# Patient Record
Sex: FEMALE | Race: WHITE | NOT HISPANIC OR LATINO | Employment: FULL TIME | URBAN - METROPOLITAN AREA
[De-identification: names, ages, dates, MRNs, and addresses within clinical notes are randomized per-mention and may not be internally consistent; named-entity substitution may affect disease eponyms.]

---

## 2017-04-22 ENCOUNTER — HOSPITAL ENCOUNTER (EMERGENCY)
Facility: HOSPITAL | Age: 27
Discharge: HOME | End: 2017-04-22
Payer: MEDICAID

## 2017-04-22 VITALS
RESPIRATION RATE: 17 BRPM | TEMPERATURE: 98 F | OXYGEN SATURATION: 100 % | BODY MASS INDEX: 35.44 KG/M2 | WEIGHT: 200 LBS | DIASTOLIC BLOOD PRESSURE: 99 MMHG | HEART RATE: 82 BPM | SYSTOLIC BLOOD PRESSURE: 162 MMHG | HEIGHT: 63 IN

## 2017-04-22 DIAGNOSIS — B96.89 BACTERIAL VAGINOSIS: Primary | ICD-10-CM

## 2017-04-22 DIAGNOSIS — N76.0 BACTERIAL VAGINOSIS: Primary | ICD-10-CM

## 2017-04-22 LAB
B-HCG UR QL: NEGATIVE
BACTERIA GENITAL QL WET PREP: ABNORMAL
BILIRUB UR QL STRIP: NEGATIVE
C TRACH DNA SPEC QL NAA+PROBE: NOT DETECTED
CLARITY UR: CLEAR
CLUE CELLS VAG QL WET PREP: ABNORMAL
COLOR UR: YELLOW
FILAMENT FUNGI VAG WET PREP-#/AREA: ABNORMAL
GLUCOSE UR QL STRIP: NEGATIVE
HGB UR QL STRIP: NEGATIVE
KETONES UR QL STRIP: NEGATIVE
LEUKOCYTE ESTERASE UR QL STRIP: NEGATIVE
N GONORRHOEA DNA SPEC QL NAA+PROBE: NOT DETECTED
NITRITE UR QL STRIP: NEGATIVE
PH UR STRIP: 6 [PH] (ref 5–8)
PROT UR QL STRIP: NEGATIVE
SP GR UR STRIP: 1.01 (ref 1–1.03)
SPECIMEN SOURCE: ABNORMAL
T VAGINALIS GENITAL QL WET PREP: ABNORMAL
URN SPEC COLLECT METH UR: NORMAL
UROBILINOGEN UR STRIP-ACNC: NEGATIVE EU/DL
WBC #/AREA VAG WET PREP: ABNORMAL
YEAST GENITAL QL WET PREP: ABNORMAL

## 2017-04-22 PROCEDURE — 87210 SMEAR WET MOUNT SALINE/INK: CPT

## 2017-04-22 PROCEDURE — 87591 N.GONORRHOEAE DNA AMP PROB: CPT

## 2017-04-22 PROCEDURE — 99284 EMERGENCY DEPT VISIT MOD MDM: CPT | Mod: 25

## 2017-04-22 PROCEDURE — 81025 URINE PREGNANCY TEST: CPT

## 2017-04-22 PROCEDURE — 96372 THER/PROPH/DIAG INJ SC/IM: CPT

## 2017-04-22 PROCEDURE — 25000003 PHARM REV CODE 250: Performed by: PHYSICIAN ASSISTANT

## 2017-04-22 PROCEDURE — 81003 URINALYSIS AUTO W/O SCOPE: CPT

## 2017-04-22 PROCEDURE — 63600175 PHARM REV CODE 636 W HCPCS: Performed by: PHYSICIAN ASSISTANT

## 2017-04-22 RX ORDER — CEFTRIAXONE 250 MG/1
250 INJECTION, POWDER, FOR SOLUTION INTRAMUSCULAR; INTRAVENOUS
Status: COMPLETED | OUTPATIENT
Start: 2017-04-22 | End: 2017-04-22

## 2017-04-22 RX ORDER — AZITHROMYCIN 250 MG/1
1000 TABLET, FILM COATED ORAL
Status: COMPLETED | OUTPATIENT
Start: 2017-04-22 | End: 2017-04-22

## 2017-04-22 RX ORDER — METRONIDAZOLE 500 MG/1
500 TABLET ORAL EVERY 12 HOURS
Qty: 14 TABLET | Refills: 0 | Status: SHIPPED | OUTPATIENT
Start: 2017-04-22 | End: 2017-04-29

## 2017-04-22 RX ADMIN — AZITHROMYCIN 1000 MG: 250 TABLET, FILM COATED ORAL at 04:04

## 2017-04-22 RX ADMIN — CEFTRIAXONE SODIUM 250 MG: 250 INJECTION, POWDER, FOR SOLUTION INTRAMUSCULAR; INTRAVENOUS at 04:04

## 2017-04-22 NOTE — ED NOTES
Pt reports he is having vaginal discharge.  Reports white or pinkish drainage. Also reports painful intercourse.

## 2017-04-22 NOTE — ED AVS SNAPSHOT
[facility] -   [address] [facility] [address]               Gerson Jimenez   2017  3:36 PM   ED    Description:  Female : 1990   Department:  [facility]           Your Care was Coordinated By:     Provider Role From To    PAMELLA Bill Physician Assistant [phone] --      Reason for Visit     Exposure to STD           Diagnoses this Visit        Comments    Bacterial vaginosis    -  Primary       ED Disposition     None           To Do List           Follow-up Information     Follow up with PCP. Call in 2 days.       These Medications        Disp Refills Start End    metronidazole (FLAGYL) 500 MG tablet 14 tablet 0 2017    Take 1 tablet (500 mg total) by mouth every 12 (twelve) hours. - Oral      [facility]     [facility] -  Assistance  Unless otherwise directed by your provider, please contact [facility], our nurse care line that is available for  assistance.     Registered nurses in the [facility] provide: appointment scheduling, clinical advisement, health education, and other advisory services.  Call: [phone] (toll free)               Medications           Message regarding Medications     Verify the changes and/or additions to your medication regime listed below are the same as discussed with your clinician today.  If any of these changes or additions are incorrect, please notify your healthcare provider.        START taking these NEW medications        Refills    metronidazole (FLAGYL) 500 MG tablet 0    Sig: Take 1 tablet (500 mg total) by mouth every 12 (twelve) hours.    Class: Print    Route: Oral      These medications were administered today        Dose Freq    cefTRIAXone injection 250 mg 250 mg ED 1 Time    Sig: Inject 250 mg into the muscle ED 1 Time.    Class: Normal    Route: Intramuscular    Cosign for Ordering: Accepted by  "Kelsea Mccann DO on 4/22/2017  4:27 PM    azithromycin tablet 1,000 mg 1,000 mg ED 1 Time    Sig: Take 4 tablets (1,000 mg total) by mouth ED 1 Time.    Class: Normal    Route: Oral    Cosign for Ordering: Accepted by Kelsea Mccann DO on 4/22/2017  4:27 PM           Verify that the below list of medications is an accurate representation of the medications you are currently taking.  If none reported, the list may be blank. If incorrect, please contact your healthcare provider. Carry this list with you in case of emergency.           Current Medications     metronidazole (FLAGYL) 500 MG tablet Take 1 tablet (500 mg total) by mouth every 12 (twelve) hours.           Clinical Reference Information           Your Vitals Were     BP Pulse Temp Resp Height Weight    162/99 (BP Location: Right arm, Patient Position: Sitting) 82 97.9 °F (36.6 °C) (Oral) 17 5' 3" (1.6 m) 90.7 kg (200 lb)    SpO2 BMI             100% 35.43 kg/m2         Allergies as of 4/22/2017        Reactions    Pcn [Penicillins] Nausea And Vomiting      Immunizations Administered on Date of Encounter - 4/22/2017     None      ED Micro, Lab, POCT     Start Ordered       Status Ordering Provider    04/22/17 1607 04/22/17 1607  Vaginal Screen Vagina  STAT      Final result     04/22/17 1607 04/22/17 1607  C. trachomatis/N. gonorrhoeae by AMP DNA Vagina  STAT      In process     04/22/17 1536 04/22/17 1536  Urinalysis  STAT      Final result     04/22/17 1536 04/22/17 1536  Pregnancy, urine rapid (UPT)  Once      Final result       ED Imaging Orders     None        Discharge Instructions         Bacterial Vaginosis    You have a vaginal infection called bacterial vaginosis (BV). Both good and bad bacteria are present in a healthy vagina. BV occurs when these bacteria get out of balance. The number of bad bacteria increase. And the number of good bacteria decrease.  BV may or may not cause symptoms. If symptoms do occur, they can include:  · Thin, " gray, milky-white, or sometimes green discharge  · Unpleasant odor or fishy smell  · Itching, burning, or pain in or around the vagina  It is not known what causes BV, but certain factors can make the problem more likely. This can include:  · Douching  · Having sex with a new partner  · Having sex with more than one partner  BV will sometimes go away on its own. But treatment is usually recommended. This is because untreated BV can increase the risk of more serious health problems such as:  · Pelvic inflammatory disease (PID)  ·  delivery (giving birth to a baby early if youre pregnant)  · HIV and certain other sexually transmitted diseases (STDs)  · Infection after surgery on the reproductive organs  Home care  General care  · BV is most often treated with medicines called antibiotics. These may be given as pills or as a vaginal cream. If antibiotics are prescribed, be sure to use them exactly as directed. Also, be sure to complete all of the medicine, even if your symptoms go away.  · Avoid douching or having sex during treatment.  · If you have sex with a female partner, ask your healthcare provider if she should also be treated.  Prevention  · Limit or avoid douching.  · Avoid having sex. If you do have sex, then take steps to lower your risk:  ¨ Use condoms when having sex.  ¨ Limit the number of partners you have sex with.  Follow-up care  Follow up with your healthcare provider, or as advised.  When to seek medical advice  Call your healthcare provider right away if:  · You have a fever of 100.4ºF (38ºC) or higher, or as directed by your provider.  · Your symptoms worsen, or they dont go away within a few days of starting treatment.  · You have new pain in the lower belly or pelvic region.  · You have side effects that bother you or a reaction to the pills or cream youre prescribed.  · You or any partners you have sex with have new symptoms, such as a rash, joint pain, or sores.  Date Last  Reviewed: 7/30/2015  © 7323-8384 [employer]. [address] [place] [address], [place] [address]. All rights reserved. This information is not intended as a substitute for professional medical care. Always follow your healthcare professional's instructions.          [facility] Sign-Up     Activating your [facility] account is as easy as 1-2-3!     1) Visit [email or web address], select Sign Up Now, enter this activation code and your date of birth, then select Next.  [email or web address]  Expires: 6/6/2017  5:13 PM      2) Create a username and password to use when you visit [facility] in the future and select a security question in case you lose your password and select Next.    3) Enter your e-mail address and click Sign Up!    Additional Information  If you have questions, please e-mail [email or web address] or call [phone] to talk to our [facility] staff. Remember, [facility] is NOT to be used for urgent needs. For medical emergencies, dial 911.         Smoking Cessation     If you would like to quit smoking:   You may be eligible for free services if you are a [place] resident and started smoking cigarettes before September 1, 1988.  Call the Smoking Cessation Trust (SCT) toll free at (432) 872-1139 or (782) 474-9332.   Call 8-739-QUIT-NOW if you do not meet the above criteria.   Contact us via email: [email or web address]   View our website for more information: [email or web address]         [facility] complies with applicable Federal civil rights laws and does not discriminate on the basis of race, color, national origin, age, disability, or sex.        Language Assistance Services     ATTENTION: Language assistance services are available, free of charge. Please call [phone].      ATENCIÓN: Si habla español, tiene a carreno disposición servicios gratuitos de asistencia lingüística. Llame al [phone].     CHÚ Ý: N?u b?n nói Ti?ng Vi?t, có các d?ch v? h? tr? ngôn ng? mi?n phí  dành cho b?cira. G?i s? [phone].

## 2017-04-22 NOTE — ED PROVIDER NOTES
SCRIBE #1 NOTE: I, Karyn Robison, am scribing for, and in the presence of, PAMELLA Bill. I have scribed the entire note.      History      Chief Complaint   Patient presents with    Exposure to STD     unknown which STD, c/o pink-tinged white vaginal discharge and pelvic pain       Review of patient's allergies indicates:   Allergen Reactions    Pcn [penicillins] Nausea And Vomiting        HPI   HPI    4/22/2017, 4:03 PM   History obtained from the patient      History of Present Illness: Gerson Jimenez is a 26 y.o. female patient who presents to the Emergency Department for sore throat. Pt reports 2 weeks ago her partner was tested positive for STD. Symptoms are constant and moderate in severity. No mitigating or exacerbating factors reported. Associated sxs include pinkish-white vaginal discharge, pelvic pain, dysuria, and light-headedness. Patient denies any CP, SOB, fever, vaginal bleeding, hematuria, and all other sxs at this time. No prior Tx. No further complaints or concerns at this time.         Arrival mode: Personal vehicle    PCP: Unknown     Past Medical History:  Past medical history reviewed not relevant      Past Surgical History:  Past surgical history reviewed not relevant      Family History:  Family history reviewed not relevant      Social History:  Social History    Social History Main Topics    Social History Main Topics    Smoking status: Unknown if ever smoked    Smokeless tobacco: Unknown if ever used    Alcohol Use: Unknown drinking history    Drug Use: Unknown if ever used    Sexual Activity: Unknown         ROS   Review of Systems   Constitutional: Negative for fever.   HENT: Positive for sore throat.    Respiratory: Negative for shortness of breath.    Cardiovascular: Negative for chest pain.   Gastrointestinal: Negative for diarrhea, nausea and vomiting.   Genitourinary: Positive for dysuria, pelvic pain and vaginal discharge. Negative for hematuria and vaginal  bleeding.   Musculoskeletal: Negative for back pain.   Skin: Negative for rash.   Neurological: Positive for light-headedness. Negative for weakness.   Hematological: Does not bruise/bleed easily.   All other systems reviewed and are negative.      Physical Exam    Initial Vitals   BP Pulse Resp Temp SpO2   04/22/17 1534 04/22/17 1534 04/22/17 1534 04/22/17 1534 04/22/17 1534   162/99 82 17 97.9 °F (36.6 °C) 100 %      Physical Exam  Nursing Notes and Vital Signs Reviewed.  Constitutional: Patient is in no acute distress. Awake and alert. Well-developed and well-nourished.  Head: Atraumatic. Normocephalic.  Eyes: PERRL. EOM intact. Conjunctivae are not pale. No scleral icterus.  ENT: Mucous membranes are moist. Oropharynx is clear and symmetric.    Neck: Supple. Full ROM. No lymphadenopathy.  Cardiovascular: Regular rate. Regular rhythm. No murmurs, rubs, or gallops. Distal pulses are 2+ and symmetric.  Pulmonary/Chest: No respiratory distress. Clear to auscultation bilaterally. No wheezing, rales, or rhonchi.  Abdominal: Soft and non-distended.  There is no tenderness.  No rebound, guarding, or rigidity. Good bowel sounds.  Musculoskeletal: Moves all extremities. No obvious deformities. No edema. No calf tenderness.  Skin: Warm and dry.  Neurological:  Alert, awake, and appropriate.  Normal speech.  No acute focal neurological deficits are appreciated.  Psychiatric: Normal affect. Good eye contact. Appropriate in content.  Pelvic: A female chaperone was present for this examination. Nl external inspection. No lesions or abnormalities were visible on the labia majora or minora. Cervical os is closed. There is no CMT. There is no blood in the vaginal vault. Scant amount of discharge. Thick, clear, and non-odorous discharge. No adnexal tenderness. No adnexal masses.      ED Course    Procedures  ED Vital Signs:  Vitals:    04/22/17 1534   BP: (!) 162/99   Pulse: 82   Resp: 17   Temp: 97.9 °F (36.6 °C)   TempSrc: Oral  "  SpO2: 100%   Weight: 90.7 kg (200 lb)   Height: 5' 3" (1.6 m)       Abnormal Lab Results:  Labs Reviewed   VAGINAL SCREEN - Abnormal; Notable for the following:        Result Value    Clue Cells, Wet Prep Few (*)     WBC - Vaginal Screen Few (*)     Bacteria - Vaginal Screen Few (*)     All other components within normal limits   C. TRACHOMATIS/N. GONORRHOEAE BY AMP DNA   URINALYSIS   PREGNANCY TEST, URINE RAPID        All Lab Results:  Results for orders placed or performed during the hospital encounter of 04/22/17   Urinalysis   Result Value Ref Range    Specimen UA Urine, Clean Catch     Color, UA Yellow Yellow, Straw, Maryse    Appearance, UA Clear Clear    pH, UA 6.0 5.0 - 8.0    Specific Gravity, UA 1.010 1.005 - 1.030    Protein, UA Negative Negative    Glucose, UA Negative Negative    Ketones, UA Negative Negative    Bilirubin (UA) Negative Negative    Occult Blood UA Negative Negative    Nitrite, UA Negative Negative    Urobilinogen, UA Negative <2.0 EU/dL    Leukocytes, UA Negative Negative   Pregnancy, urine rapid (UPT)   Result Value Ref Range    Preg Test, Ur Negative    Vaginal Screen Vagina   Result Value Ref Range    Trichomonas None None    Clue Cells, Wet Prep Few (A) None    Budding Yeast None None    Fungal Hyphae None None    WBC - Vaginal Screen Few (A) None    Bacteria - Vaginal Screen Few (A) None    Wet Prep Source Vagina None              The Emergency Provider reviewed the vital signs and test results, which are outlined above.    ED Discussion     5:16 PM: Discussed with pt all pertinent ED information and results. Discussed pt dx and plan of tx. Gave pt all f/u and return to the ED instructions. All questions and concerns were addressed at this time. Pt expresses understanding of information and instructions, and is comfortable with plan to discharge. Pt is stable for discharge.        ED Medication(s):  Medications   cefTRIAXone injection 250 mg (250 mg Intramuscular Given 4/22/17 " 1627)   azithromycin tablet 1,000 mg (1,000 mg Oral Given 4/22/17 1625)       New Prescriptions    METRONIDAZOLE (FLAGYL) 500 MG TABLET    Take 1 tablet (500 mg total) by mouth every 12 (twelve) hours.       Follow-up Information     Follow up with PCP. Call in 2 days.            Medical Decision Making    Medical Decision Making:   Clinical Tests:   Lab Tests: Ordered and Reviewed           Scribe Attestation:   Scribe #1: I performed the above scribed service and the documentation accurately describes the services I performed. I attest to the accuracy of the note.    Attending:   Physician Attestation Statement for Scribe #1: I, PAMELLA Bill, personally performed the services described in this documentation, as scribed by Karyn Robison, in my presence, and it is both accurate and complete.          Clinical Impression       ICD-10-CM ICD-9-CM   1. Bacterial vaginosis N76.0 616.10    B96.89 041.9       Disposition:   Disposition: Discharged  Condition: Stable         PAMELLA Bill  04/25/17 1258

## 2017-04-22 NOTE — DISCHARGE INSTRUCTIONS

## 2017-07-17 ENCOUNTER — HOSPITAL ENCOUNTER (EMERGENCY)
Facility: HOSPITAL | Age: 27
Discharge: HOME | End: 2017-07-18
Attending: EMERGENCY MEDICINE
Payer: MEDICAID

## 2017-07-17 DIAGNOSIS — R52 PAIN: ICD-10-CM

## 2017-07-17 DIAGNOSIS — M25.561 ACUTE PAIN OF RIGHT KNEE: Primary | ICD-10-CM

## 2017-07-17 PROCEDURE — 99283 EMERGENCY DEPT VISIT LOW MDM: CPT

## 2017-07-18 VITALS
WEIGHT: 210 LBS | HEART RATE: 79 BPM | HEIGHT: 63 IN | DIASTOLIC BLOOD PRESSURE: 79 MMHG | OXYGEN SATURATION: 99 % | RESPIRATION RATE: 17 BRPM | BODY MASS INDEX: 37.21 KG/M2 | TEMPERATURE: 98 F | SYSTOLIC BLOOD PRESSURE: 142 MMHG

## 2017-07-18 PROCEDURE — 25000003 PHARM REV CODE 250: Performed by: EMERGENCY MEDICINE

## 2017-07-18 RX ORDER — HYDROCODONE BITARTRATE AND ACETAMINOPHEN 5; 325 MG/1; MG/1
1 TABLET ORAL EVERY 4 HOURS PRN
Qty: 18 TABLET | Refills: 0 | Status: SHIPPED | OUTPATIENT
Start: 2017-07-18 | End: 2017-12-30 | Stop reason: ALTCHOICE

## 2017-07-18 RX ORDER — MELOXICAM 15 MG/1
15 TABLET ORAL DAILY
Qty: 15 TABLET | Refills: 0 | Status: SHIPPED | OUTPATIENT
Start: 2017-07-18 | End: 2017-12-30 | Stop reason: ALTCHOICE

## 2017-07-18 RX ORDER — HYDROCODONE BITARTRATE AND ACETAMINOPHEN 10; 325 MG/1; MG/1
1 TABLET ORAL
Status: COMPLETED | OUTPATIENT
Start: 2017-07-18 | End: 2017-07-18

## 2017-07-18 RX ADMIN — HYDROCODONE BITARTRATE AND ACETAMINOPHEN 1 TABLET: 10; 325 TABLET ORAL at 12:07
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
## 2017-12-30 ENCOUNTER — HOSPITAL ENCOUNTER (EMERGENCY)
Facility: HOSPITAL | Age: 27
Discharge: HOME | End: 2017-12-30
Attending: EMERGENCY MEDICINE
Payer: MEDICAID

## 2017-12-30 VITALS
HEART RATE: 94 BPM | RESPIRATION RATE: 18 BRPM | DIASTOLIC BLOOD PRESSURE: 85 MMHG | SYSTOLIC BLOOD PRESSURE: 169 MMHG | WEIGHT: 200 LBS | TEMPERATURE: 99 F | HEIGHT: 63 IN | OXYGEN SATURATION: 98 % | BODY MASS INDEX: 35.44 KG/M2

## 2017-12-30 DIAGNOSIS — A59.9 TRICHOMONOSIS: Primary | ICD-10-CM

## 2017-12-30 DIAGNOSIS — M54.50 ACUTE BILATERAL LOW BACK PAIN WITHOUT SCIATICA: ICD-10-CM

## 2017-12-30 LAB
B-HCG UR QL: NEGATIVE
BACTERIA GENITAL QL WET PREP: ABNORMAL
BILIRUB UR QL STRIP: NEGATIVE
CLARITY UR: CLEAR
CLUE CELLS VAG QL WET PREP: ABNORMAL
COLOR UR: YELLOW
FILAMENT FUNGI VAG WET PREP-#/AREA: ABNORMAL
GLUCOSE UR QL STRIP: NEGATIVE
HGB UR QL STRIP: NEGATIVE
KETONES UR QL STRIP: NEGATIVE
LEUKOCYTE ESTERASE UR QL STRIP: NEGATIVE
NITRITE UR QL STRIP: NEGATIVE
PH UR STRIP: 6 [PH] (ref 5–8)
PROT UR QL STRIP: ABNORMAL
SP GR UR STRIP: >=1.03 (ref 1–1.03)
SPECIMEN SOURCE: ABNORMAL
T VAGINALIS GENITAL QL WET PREP: ABNORMAL
URN SPEC COLLECT METH UR: ABNORMAL
UROBILINOGEN UR STRIP-ACNC: NEGATIVE EU/DL
WBC #/AREA VAG WET PREP: ABNORMAL
YEAST GENITAL QL WET PREP: ABNORMAL

## 2017-12-30 PROCEDURE — 81003 URINALYSIS AUTO W/O SCOPE: CPT

## 2017-12-30 PROCEDURE — 81025 URINE PREGNANCY TEST: CPT

## 2017-12-30 PROCEDURE — 96372 THER/PROPH/DIAG INJ SC/IM: CPT

## 2017-12-30 PROCEDURE — 25000003 PHARM REV CODE 250: Performed by: EMERGENCY MEDICINE

## 2017-12-30 PROCEDURE — 87491 CHLMYD TRACH DNA AMP PROBE: CPT

## 2017-12-30 PROCEDURE — 63600175 PHARM REV CODE 636 W HCPCS: Performed by: EMERGENCY MEDICINE

## 2017-12-30 PROCEDURE — 99284 EMERGENCY DEPT VISIT MOD MDM: CPT | Mod: 25

## 2017-12-30 PROCEDURE — 87210 SMEAR WET MOUNT SALINE/INK: CPT

## 2017-12-30 RX ORDER — CYCLOBENZAPRINE HCL 10 MG
10 TABLET ORAL 3 TIMES DAILY PRN
Qty: 9 TABLET | Refills: 0 | Status: SHIPPED | OUTPATIENT
Start: 2017-12-30 | End: 2018-01-04

## 2017-12-30 RX ORDER — ONDANSETRON 4 MG/1
4 TABLET, ORALLY DISINTEGRATING ORAL
Status: COMPLETED | OUTPATIENT
Start: 2017-12-30 | End: 2017-12-30

## 2017-12-30 RX ORDER — AZITHROMYCIN 250 MG/1
1000 TABLET, FILM COATED ORAL
Status: COMPLETED | OUTPATIENT
Start: 2017-12-30 | End: 2017-12-30

## 2017-12-30 RX ORDER — CEFTRIAXONE 250 MG/1
250 INJECTION, POWDER, FOR SOLUTION INTRAMUSCULAR; INTRAVENOUS
Status: COMPLETED | OUTPATIENT
Start: 2017-12-30 | End: 2017-12-30

## 2017-12-30 RX ORDER — METRONIDAZOLE 500 MG/1
500 TABLET ORAL 3 TIMES DAILY
Qty: 21 TABLET | Refills: 0 | Status: SHIPPED | OUTPATIENT
Start: 2017-12-30 | End: 2018-01-06

## 2017-12-30 RX ORDER — ONDANSETRON 4 MG/1
4 TABLET, ORALLY DISINTEGRATING ORAL EVERY 8 HOURS PRN
Qty: 12 TABLET | Refills: 0 | Status: SHIPPED | OUTPATIENT
Start: 2017-12-30 | End: 2022-06-10

## 2017-12-30 RX ORDER — NAPROXEN 500 MG/1
500 TABLET ORAL
Status: COMPLETED | OUTPATIENT
Start: 2017-12-30 | End: 2017-12-30

## 2017-12-30 RX ORDER — TRAMADOL HYDROCHLORIDE 50 MG/1
50 TABLET ORAL EVERY 6 HOURS PRN
Qty: 12 TABLET | Refills: 0 | Status: SHIPPED | OUTPATIENT
Start: 2017-12-30 | End: 2022-06-10

## 2017-12-30 RX ORDER — TRAMADOL HYDROCHLORIDE 50 MG/1
50 TABLET ORAL
Status: COMPLETED | OUTPATIENT
Start: 2017-12-30 | End: 2017-12-30

## 2017-12-30 RX ORDER — NAPROXEN 500 MG/1
500 TABLET ORAL 2 TIMES DAILY WITH MEALS
Qty: 20 TABLET | Refills: 0 | Status: SHIPPED | OUTPATIENT
Start: 2017-12-30 | End: 2022-06-10

## 2017-12-30 RX ADMIN — TRAMADOL HYDROCHLORIDE 50 MG: 50 TABLET, COATED ORAL at 12:12

## 2017-12-30 RX ADMIN — NAPROXEN 500 MG: 500 TABLET ORAL at 12:12

## 2017-12-30 RX ADMIN — AZITHROMYCIN 1000 MG: 250 TABLET, FILM COATED ORAL at 12:12

## 2017-12-30 RX ADMIN — ONDANSETRON 4 MG: 4 TABLET, ORALLY DISINTEGRATING ORAL at 12:12

## 2017-12-30 RX ADMIN — CEFTRIAXONE SODIUM 250 MG: 250 INJECTION, POWDER, FOR SOLUTION INTRAMUSCULAR; INTRAVENOUS at 12:12

## 2017-12-30 NOTE — ED PROVIDER NOTES
"SCRIBE #1 NOTE: I, Ivan Lundy, am scribing for, and in the presence of, Miguel Trimble MD. I have scribed the entire note.      History      Chief Complaint   Patient presents with    Back Pain     lower back pain with nausea. Denies other urinary symptoms.        Review of patient's allergies indicates:   Allergen Reactions    Pcn [penicillins] Nausea And Vomiting        HPI   HPI    12/30/2017, 11:18 AM   History obtained from the patient      History of Present Illness: Gerson Jimenez is a 27 y.o. female patient who presents to the Emergency Department for lower back pain which onset gradually " a few days ago". Symptoms are constant and moderate in severity.  No mitigating or exacerbating factors reported. Associated sxs include nausea. Pt also complains that her sexual partner was recently diagnosed with mycoplasma genital, and requests treatment at this time. Pt complains of "white" vaginal discharge. Pt's LNMP was 12/14/2017. Patient denies any fever, chills, bladder/ bowel incontinence, saddle anesthesia, vaginal bleeding/ pain, dysuria, hematuria, and all other sxs at this time. No further complaints or concerns at this time.         Arrival mode: Personal vehicle    PCP: No primary care provider on file.       Past Medical History:  History reviewed. No pertinent past medical history.    Past Surgical History:  Past Surgical History:   Procedure Laterality Date    ABDOMINAL SURGERY      APPENDECTOMY      TONSILLECTOMY           Family History:  Family History   Problem Relation Age of Onset    No Known Problems Mother     No Known Problems Father        Social History:  Social History     Social History Main Topics    Smoking status: Never Smoker    Smokeless tobacco: Never Used    Alcohol use No    Drug use: No    Sexual activity: Yes       ROS   Review of Systems   Constitutional: Negative for chills and fever.        + possible exposure to STD   Respiratory: Negative for shortness of " breath.    Cardiovascular: Negative for chest pain.   Gastrointestinal: Negative for abdominal pain, nausea and vomiting.        - bowel incontinence   Genitourinary: Positive for vaginal discharge. Negative for difficulty urinating, dysuria, frequency, hematuria, urgency, vaginal bleeding and vaginal pain.        - bladder incontinence   Musculoskeletal: Positive for back pain.   Skin: Negative for rash.   Neurological: Negative for dizziness, weakness, light-headedness, numbness and headaches.        - saddle anesthesia   All other systems reviewed and are negative.      Physical Exam      Initial Vitals [12/30/17 1016]   BP Pulse Resp Temp SpO2   (!) 169/85 94 18 98.9 °F (37.2 °C) 98 %      MAP       113          Physical Exam  Nursing Notes and Vital Signs Reviewed.  Constitutional: Patient is in no apparent distress. Well-developed and well-nourished.  Head: Atraumatic. Normocephalic.  Eyes: PERRL. EOM intact. Conjunctivae are not pale. No scleral icterus.  ENT: Mucous membranes are moist. Oropharynx is clear and symmetric.    Neck: Supple. Full ROM. No lymphadenopathy.  Cardiovascular: Regular rate. Regular rhythm. No murmurs, rubs, or gallops. Distal pulses are 2+ and symmetric.  Pulmonary/Chest: No respiratory distress. Clear to auscultation bilaterally. No wheezing or rales.  Abdominal: Soft and non-distended.  There is no tenderness.  No rebound, guarding, or rigidity. Good bowel sounds.  Musculoskeletal: Moves all extremities. No obvious deformities. No edema.   Back: No midline bony tenderness, deformities, or step-offs of the T-spine or L-spine. Skin appears normal without abrasions or bruising. No erythema, induration, or fluctuance.   Pelvic: A female chaperone was present for this examination. Nl external inspection. No lesions or abnormalities were visible on the labia majora or minora. Cervical os is closed. There is no CMT. There is no blood in the vaginal vault. Scant white, mucoid vaginal  "discharge noted. No adnexal tenderness. No adnexal masses.  Skin: Warm and dry.  Neurological:  Alert, awake, and appropriate.  Normal speech.  No acute focal neurological deficits are appreciated.  Psychiatric: Normal affect. Good eye contact. Appropriate in content.    ED Course    Procedures  ED Vital Signs:  Vitals:    12/30/17 1016   BP: (!) 169/85   Pulse: 94   Resp: 18   Temp: 98.9 °F (37.2 °C)   TempSrc: Oral   SpO2: 98%   Weight: 90.7 kg (200 lb)   Height: 5' 3" (1.6 m)       Abnormal Lab Results:  Labs Reviewed   URINALYSIS - Abnormal; Notable for the following:        Result Value    Specific Gravity, UA >=1.030 (*)     Protein, UA Trace (*)     All other components within normal limits   VAGINAL SCREEN - Abnormal; Notable for the following:     Clue Cells, Wet Prep Few (*)     Bacteria - Vaginal Screen Few (*)     All other components within normal limits   C. TRACHOMATIS/N. GONORRHOEAE BY AMP DNA   PREGNANCY TEST, URINE RAPID        All Lab Results:  Results for orders placed or performed during the hospital encounter of 12/30/17   C. trachomatis/N. gonorrhoeae by AMP DNA Cervix   Result Value Ref Range    Chlamydia, Amplified DNA Not Detected     N gonorrhoeae, amplified DNA Not Detected    Urinalysis Clean Catch   Result Value Ref Range    Specimen UA Urine, Clean Catch     Color, UA Yellow Yellow, Straw, Maryse    Appearance, UA Clear Clear    pH, UA 6.0 5.0 - 8.0    Specific Gravity, UA >=1.030 (A) 1.005 - 1.030    Protein, UA Trace (A) Negative    Glucose, UA Negative Negative    Ketones, UA Negative Negative    Bilirubin (UA) Negative Negative    Occult Blood UA Negative Negative    Nitrite, UA Negative Negative    Urobilinogen, UA Negative <2.0 EU/dL    Leukocytes, UA Negative Negative   Pregnancy, urine rapid (UPT)   Result Value Ref Range    Preg Test, Ur Negative    Vaginal Screen Vagina   Result Value Ref Range    Trichomonas None None    Clue Cells, Wet Prep Few (A) None    Budding Yeast None " None    Fungal Hyphae None None    WBC - Vaginal Screen None None    Bacteria - Vaginal Screen Few (A) None    Wet Prep Source Vagina None            The Emergency Provider reviewed the vital signs and test results, which are outlined above.    ED Discussion     1:28 PM: Discussed with pt all pertinent ED information and results. Discussed pt dx and plan of tx. Gave pt all f/u and return to the ED instructions. All questions and concerns were addressed at this time. Pt expresses understanding of information and instructions, and is comfortable with plan to discharge. Pt is stable for discharge.    I discussed with patient and/or family/caretaker that evaluation in the ED does not suggest any emergent or life threatening medical conditions requiring immediate intervention beyond what was provided in the ED, and I believe patient is safe for discharge.  Regardless, an unremarkable evaluation in the ED does not preclude the development or presence of a serious of life threatening condition. As such, patient was instructed to return immediately for any worsening or change in current symptoms.      ED Medication(s):  Medications   traMADol tablet 50 mg (50 mg Oral Given 12/30/17 1224)   naproxen tablet 500 mg (500 mg Oral Given 12/30/17 1223)   cefTRIAXone injection 250 mg (250 mg Intramuscular Given 12/30/17 1224)   azithromycin tablet 1,000 mg (1,000 mg Oral Given 12/30/17 1224)   ondansetron disintegrating tablet 4 mg (4 mg Oral Given 12/30/17 1256)       Discharge Medication List as of 12/30/2017  1:29 PM      START taking these medications    Details   cyclobenzaprine (FLEXERIL) 10 MG tablet Take 1 tablet (10 mg total) by mouth 3 (three) times daily as needed for Muscle spasms., Starting Sat 12/30/2017, Until u 1/4/2018, Print      metroNIDAZOLE (FLAGYL) 500 MG tablet Take 1 tablet (500 mg total) by mouth 3 (three) times daily., Starting Sat 12/30/2017, Until Sat 1/6/2018, Print      naproxen (NAPROSYN) 500 MG  tablet Take 1 tablet (500 mg total) by mouth 2 (two) times daily with meals., Starting Sat 12/30/2017, Print      ondansetron (ZOFRAN-ODT) 4 MG TbDL Take 1 tablet (4 mg total) by mouth every 8 (eight) hours as needed., Starting Sat 12/30/2017, Print      traMADol (ULTRAM) 50 mg tablet Take 1 tablet (50 mg total) by mouth every 6 (six) hours as needed for Pain., Starting Sat 12/30/2017, Print             Follow-up Information     your PCP.    Why:  Follow up with your primary doctor in the next 2-3 days for a re-check.  Return to the emergency department for any worsening signs or symptoms.                   Medical Decision Making    Medical Decision Making:   Clinical Tests:   Lab Tests: Ordered and Reviewed           Scribe Attestation:   Scribe #1: I performed the above scribed service and the documentation accurately describes the services I performed. I attest to the accuracy of the note.    Attending:   Physician Attestation Statement for Scribe #1: I, Miguel Trimble MD, personally performed the services described in this documentation, as scribed by Ivan Lundy, in my presence, and it is both accurate and complete.          Clinical Impression       ICD-10-CM ICD-9-CM   1. Trichomonosis A59.9 131.9   2. Acute bilateral low back pain without sciatica M54.5 724.2     338.19       Disposition:   Disposition: Discharged  Condition: Stable  ,     Miguel Trimble MD  12/31/17 9484       Miguel Trimble MD  12/31/17 4590

## 2017-12-31 LAB
C TRACH DNA SPEC QL NAA+PROBE: NOT DETECTED
N GONORRHOEA DNA SPEC QL NAA+PROBE: NOT DETECTED

## 2020-06-15 ENCOUNTER — HOSPITAL ENCOUNTER (EMERGENCY)
Facility: HOSPITAL | Age: 30
Discharge: HOME | End: 2020-06-15
Attending: EMERGENCY MEDICINE
Payer: MEDICAID

## 2020-06-15 VITALS
HEIGHT: 63 IN | BODY MASS INDEX: 46.07 KG/M2 | TEMPERATURE: 99 F | OXYGEN SATURATION: 99 % | RESPIRATION RATE: 18 BRPM | DIASTOLIC BLOOD PRESSURE: 79 MMHG | WEIGHT: 260 LBS | HEART RATE: 80 BPM | SYSTOLIC BLOOD PRESSURE: 162 MMHG

## 2020-06-15 DIAGNOSIS — K08.89 PAIN, DENTAL: Primary | ICD-10-CM

## 2020-06-15 DIAGNOSIS — Z20.822 COVID-19 VIRUS NOT DETECTED: ICD-10-CM

## 2020-06-15 LAB
HIV 1+2 AB+HIV1 P24 AG SERPL QL IA: NEGATIVE
SARS-COV-2 RDRP RESP QL NAA+PROBE: NEGATIVE

## 2020-06-15 PROCEDURE — 25000003 PHARM REV CODE 250: Performed by: EMERGENCY MEDICINE

## 2020-06-15 PROCEDURE — 99283 EMERGENCY DEPT VISIT LOW MDM: CPT

## 2020-06-15 PROCEDURE — 86703 HIV-1/HIV-2 1 RESULT ANTBDY: CPT

## 2020-06-15 PROCEDURE — U0002 COVID-19 LAB TEST NON-CDC: HCPCS

## 2020-06-15 RX ORDER — IBUPROFEN 600 MG/1
600 TABLET ORAL
Status: COMPLETED | OUTPATIENT
Start: 2020-06-15 | End: 2020-06-15

## 2020-06-15 RX ORDER — ACETAMINOPHEN 500 MG
1000 TABLET ORAL
Status: COMPLETED | OUTPATIENT
Start: 2020-06-15 | End: 2020-06-15

## 2020-06-15 RX ADMIN — ACETAMINOPHEN 1000 MG: 500 TABLET ORAL at 04:06

## 2020-06-15 RX ADMIN — IBUPROFEN 600 MG: 600 TABLET, FILM COATED ORAL at 04:06

## 2020-06-15 NOTE — ED PROVIDER NOTES
SCRIBE #1 NOTE: I, Sonja Myers, am scribing for, and in the presence of, Michel Nj MD. I have scribed the entire note.      History      Chief Complaint   Patient presents with    Dental Pain     dentist needs COVID test for dental procedure       Review of patient's allergies indicates:   Allergen Reactions    Pcn [penicillins] Nausea And Vomiting        HPI   HPI    6/15/2020, 4:06 PM   History obtained from the patient      History of Present Illness: Gerson Jimenez is a 29 y.o. female patient who presents to the Emergency Department for R bottom dental pain, onset just PTA. Pt reports she needs a negative covid test before her oral surgery. LNMP on 5/25. Symptoms are constant and moderate in severity. No mitigating or exacerbating factors reported. No associated sxs reported. Patient denies any fever, chills, nausea, SOB, cough, congestion, CP, HA, numbness, weakness, fatigue, and all other sxs at this time. Prior Tx includes Ibuprofen taken yesterday with mild relief of sx. Pt reports she has not taken any abx recently. No further complaints or concerns at this time.         Arrival mode: Personal vehicle      PCP: Primary Doctor No       Past Medical History:  History reviewed. No pertinent medical history.     Past Surgical History:  Past Surgical History:   Procedure Laterality Date    ABDOMINAL SURGERY      APPENDECTOMY      TONSILLECTOMY           Family History:  Family History   Problem Relation Age of Onset    No Known Problems Mother     No Known Problems Father        Social History:  Social History     Tobacco Use    Smoking status: Never Smoker    Smokeless tobacco: Never Used   Substance and Sexual Activity    Alcohol use: No    Drug use: No    Sexual activity: Yes       ROS   Review of Systems   Constitutional: Negative for chills, fatigue and fever.   HENT: Positive for dental problem (R bottom dental pain). Negative for congestion, rhinorrhea and sore throat.     Respiratory: Negative for cough and shortness of breath.    Cardiovascular: Negative for chest pain.   Gastrointestinal: Negative for abdominal pain, diarrhea, nausea and vomiting.   Genitourinary: Negative for dysuria and flank pain.   Musculoskeletal: Negative for back pain.   Skin: Negative for rash.   Neurological: Negative for dizziness, weakness, light-headedness, numbness and headaches.   Hematological: Does not bruise/bleed easily.   All other systems reviewed and are negative.      Physical Exam      Initial Vitals [06/15/20 1605]   BP Pulse Resp Temp SpO2   (!) 170/81 84 18 99 °F (37.2 °C) 99 %      MAP       --          Physical Exam  Nursing Notes and Vital Signs Reviewed.  Constitutional: Patient is in no acute distress. Well-developed and well-nourished.  Head: Atraumatic. Normocephalic.  Eyes: PERRL. EOM intact. Conjunctivae are not pale. No scleral icterus.  ENT: Mucous membranes are moist. Oropharynx is clear and symmetric.    Mouth: No evident facial swelling. Patient handles secretions normally. negative for dental caries. negative for gingival edema. No palpable fluctuance. No evidence of periodontal or periapical abscess. No trismus.   Neck: Supple. Full ROM. No lymphadenopathy.  Cardiovascular: Regular rate. Regular rhythm. No murmurs, rubs, or gallops. Distal pulses are 2+ and symmetric.  Pulmonary/Chest: No respiratory distress. Clear to auscultation bilaterally. No wheezing or rales.  Abdominal: Soft and non-distended.  There is no tenderness.  No rebound, guarding, or rigidity. Good bowel sounds.  Musculoskeletal: Moves all extremities. No obvious deformities. No edema. No calf tenderness.  Skin: Warm and dry.  Neurological:  Alert, awake, and appropriate.  Normal speech.  No acute focal neurological deficits are appreciated.  Psychiatric: Normal affect. Good eye contact. Appropriate in content.    ED Course    Procedures  ED Vital Signs:  Vitals:    06/15/20 1605 06/15/20 1653   BP: (!)  "170/81 (!) 162/79   Pulse: 84 80   Resp: 18 18   Temp: 99 °F (37.2 °C) 98.5 °F (36.9 °C)   TempSrc: Oral Oral   SpO2: 99% 99%   Weight: 117.9 kg (260 lb)    Height: 5' 3" (1.6 m)        Abnormal Lab Results:  Labs Reviewed   SARS-COV-2 RNA AMPLIFICATION, QUAL   HIV 1 / 2 ANTIBODY        All Lab Results:  Results for orders placed or performed during the hospital encounter of 06/15/20   COVID-19 Rapid Screening   Result Value Ref Range    SARS-CoV-2 RNA, Amplification, Qual Negative Negative   HIV 1/2 Ag/Ab (4th Gen)   Result Value Ref Range    HIV 1/2 Ag/Ab Negative Negative         Imaging Results:  Imaging Results    None                 The Emergency Provider reviewed the vital signs and test results, which are outlined above.    ED Discussion     4:50 PM: Reassessed pt at this time. Discussed with pt all pertinent ED information and results. Discussed pt dx and plan of tx. Gave pt all f/u and return to the ED instructions. All questions and concerns were addressed at this time. Pt expresses understanding of information and instructions, and is comfortable with plan to discharge. Pt is stable for discharge.    I discussed with patient and/or family/caretaker that evaluation in the ED does not suggest any emergent or life threatening medical conditions requiring immediate intervention beyond what was provided in the ED, and I believe patient is safe for discharge.  Regardless, an unremarkable evaluation in the ED does not preclude the development or presence of a serious of life threatening condition. As such, patient was instructed to return immediately for any worsening or change in current symptoms.           ED Medication(s):  Medications   acetaminophen tablet 1,000 mg (1,000 mg Oral Given 6/15/20 1625)   ibuprofen tablet 600 mg (600 mg Oral Given 6/15/20 1624)       Follow-up Information     Your Dentist. Schedule an appointment as soon as possible for a visit in 2 days.    Why: For re-evaluation and further " treatment           [facility]. Go today.    Specialty: Emergency Medicine  Why: If symptoms worsen, For re-evaluation and further treatment, As needed  Contact information:  [address]  [facility] [address] [phone]                 Discharge Medication List as of 6/15/2020  4:50 PM          Medical Decision Making    Medical Decision Making:   Clinical Tests:   Lab Tests: Ordered and Reviewed           Scribe Attestation:   Scribe #1: I performed the above scribed service and the documentation accurately describes the services I performed. I attest to the accuracy of the note.    Attending:   Physician Attestation Statement for Scribe #1: I, Michel Nj MD, personally performed the services described in this documentation, as scribed by Sonja Myers, in my presence, and it is both accurate and complete.          Clinical Impression       ICD-10-CM ICD-9-CM   1. Pain, dental  K08.89 525.9   2. Covid-19 Virus not Detected  [id number]        Disposition:   Disposition: Discharged  Condition: Stable         Michel Nj MD  06/16/20 0152

## 2020-06-15 NOTE — Clinical Note
"Gerson"IWONA Jimenez was seen and treated in our emergency department on 6/15/2020.     COVID-19 is present in our communities across the state. There is limited testing for COVID at this time, so not all patients can be tested. In this situation, your employee meets the following criteria:    Gerson Jimenez has met the criteria for COVID-19 testing and has a NEGATIVE result. The employee can return to work once they are asymptomatic for 72 hours without the use of fever reducing medications (Tylenol, Motrin, etc).     If you have any questions or concerns, or if I can be of further assistance, please do not hesitate to contact me.    Sincerely,             Michel Nj MD"

## 2022-06-06 ENCOUNTER — TELEPHONE (OUTPATIENT)
Dept: FAMILY MEDICINE | Facility: CLINIC | Age: 32
End: 2022-06-06
Payer: COMMERCIAL

## 2022-06-06 NOTE — TELEPHONE ENCOUNTER
No, I don't know her and she is only 31. Has to be evaluated first to see what is appropriate for her age.

## 2022-06-06 NOTE — TELEPHONE ENCOUNTER
----- Message from Katherine Mcgovern sent at 6/6/2022  1:42 PM CDT -----  Contact: PT - [phone]  Caller: PT - [phone]    Reason: requesting annual blood work to be added and attached to appt on 6/10/22 for annual visit     PT has access to [facility]

## 2022-06-10 ENCOUNTER — OFFICE VISIT (OUTPATIENT)
Dept: FAMILY MEDICINE | Facility: CLINIC | Age: 32
End: 2022-06-10
Payer: COMMERCIAL

## 2022-06-10 ENCOUNTER — LAB VISIT (OUTPATIENT)
Dept: LAB | Facility: HOSPITAL | Age: 32
End: 2022-06-10
Attending: FAMILY MEDICINE
Payer: COMMERCIAL

## 2022-06-10 VITALS
HEART RATE: 103 BPM | TEMPERATURE: 97 F | BODY MASS INDEX: 48.32 KG/M2 | DIASTOLIC BLOOD PRESSURE: 83 MMHG | WEIGHT: 272.69 LBS | HEIGHT: 63 IN | OXYGEN SATURATION: 96 % | SYSTOLIC BLOOD PRESSURE: 130 MMHG

## 2022-06-10 DIAGNOSIS — R05.3 PERSISTENT COUGH: ICD-10-CM

## 2022-06-10 DIAGNOSIS — E66.01 MORBID OBESITY: ICD-10-CM

## 2022-06-10 DIAGNOSIS — Z00.00 PREVENTATIVE HEALTH CARE: ICD-10-CM

## 2022-06-10 DIAGNOSIS — F41.1 GAD (GENERALIZED ANXIETY DISORDER): ICD-10-CM

## 2022-06-10 DIAGNOSIS — I10 ESSENTIAL (PRIMARY) HYPERTENSION: Primary | ICD-10-CM

## 2022-06-10 LAB
ALBUMIN SERPL BCP-MCNC: 3.5 G/DL (ref 3.5–5.2)
ALP SERPL-CCNC: 82 U/L (ref 55–135)
ALT SERPL W/O P-5'-P-CCNC: 18 U/L (ref 10–44)
ANION GAP SERPL CALC-SCNC: 11 MMOL/L (ref 8–16)
AST SERPL-CCNC: 17 U/L (ref 10–40)
BASOPHILS # BLD AUTO: 0.14 K/UL (ref 0–0.2)
BASOPHILS NFR BLD: 1.1 % (ref 0–1.9)
BILIRUB SERPL-MCNC: 0.3 MG/DL (ref 0.1–1)
BUN SERPL-MCNC: 11 MG/DL (ref 6–20)
CALCIUM SERPL-MCNC: 9.5 MG/DL (ref 8.7–10.5)
CHLORIDE SERPL-SCNC: 105 MMOL/L (ref 95–110)
CHOLEST SERPL-MCNC: 124 MG/DL (ref 120–199)
CHOLEST/HDLC SERPL: 3.6 {RATIO} (ref 2–5)
CO2 SERPL-SCNC: 21 MMOL/L (ref 23–29)
CREAT SERPL-MCNC: 0.8 MG/DL (ref 0.5–1.4)
DIFFERENTIAL METHOD: ABNORMAL
EOSINOPHIL # BLD AUTO: 1.9 K/UL (ref 0–0.5)
EOSINOPHIL NFR BLD: 15.4 % (ref 0–8)
ERYTHROCYTE [DISTWIDTH] IN BLOOD BY AUTOMATED COUNT: 12.9 % (ref 11.5–14.5)
EST. GFR  (AFRICAN AMERICAN): >60 ML/MIN/1.73 M^2
EST. GFR  (NON AFRICAN AMERICAN): >60 ML/MIN/1.73 M^2
ESTIMATED AVG GLUCOSE: 97 MG/DL (ref 68–131)
GLUCOSE SERPL-MCNC: 85 MG/DL (ref 70–110)
HBA1C MFR BLD: 5 % (ref 4–5.6)
HCT VFR BLD AUTO: 45.4 % (ref 37–48.5)
HDLC SERPL-MCNC: 34 MG/DL (ref 40–75)
HDLC SERPL: 27.4 % (ref 20–50)
HGB BLD-MCNC: 14.5 G/DL (ref 12–16)
IMM GRANULOCYTES # BLD AUTO: 0.16 K/UL (ref 0–0.04)
IMM GRANULOCYTES NFR BLD AUTO: 1.3 % (ref 0–0.5)
LDLC SERPL CALC-MCNC: 69.6 MG/DL (ref 63–159)
LYMPHOCYTES # BLD AUTO: 3.3 K/UL (ref 1–4.8)
LYMPHOCYTES NFR BLD: 26.4 % (ref 18–48)
MCH RBC QN AUTO: 27.7 PG (ref 27–31)
MCHC RBC AUTO-ENTMCNC: 31.9 G/DL (ref 32–36)
MCV RBC AUTO: 87 FL (ref 82–98)
MONOCYTES # BLD AUTO: 0.6 K/UL (ref 0.3–1)
MONOCYTES NFR BLD: 4.5 % (ref 4–15)
NEUTROPHILS # BLD AUTO: 6.3 K/UL (ref 1.8–7.7)
NEUTROPHILS NFR BLD: 51.3 % (ref 38–73)
NONHDLC SERPL-MCNC: 90 MG/DL
NRBC BLD-RTO: 0 /100 WBC
PLATELET # BLD AUTO: 363 K/UL (ref 150–450)
PMV BLD AUTO: 11.4 FL (ref 9.2–12.9)
POTASSIUM SERPL-SCNC: 4.2 MMOL/L (ref 3.5–5.1)
PROT SERPL-MCNC: 8 G/DL (ref 6–8.4)
RBC # BLD AUTO: 5.24 M/UL (ref 4–5.4)
SODIUM SERPL-SCNC: 137 MMOL/L (ref 136–145)
TRIGL SERPL-MCNC: 102 MG/DL (ref 30–150)
TSH SERPL DL<=0.005 MIU/L-ACNC: 2.58 UIU/ML (ref 0.4–4)
WBC # BLD AUTO: 12.34 K/UL (ref 3.9–12.7)

## 2022-06-10 PROCEDURE — 83036 HEMOGLOBIN GLYCOSYLATED A1C: CPT | Performed by: FAMILY MEDICINE

## 2022-06-10 PROCEDURE — 86803 HEPATITIS C AB TEST: CPT | Performed by: FAMILY MEDICINE

## 2022-06-10 PROCEDURE — 1159F PR MEDICATION LIST DOCUMENTED IN MEDICAL RECORD: ICD-10-PCS | Mod: CPTII,S$GLB,, | Performed by: FAMILY MEDICINE

## 2022-06-10 PROCEDURE — 3075F PR MOST RECENT SYSTOLIC BLOOD PRESS GE 130-139MM HG: ICD-10-PCS | Mod: CPTII,S$GLB,, | Performed by: FAMILY MEDICINE

## 2022-06-10 PROCEDURE — 85025 COMPLETE CBC W/AUTO DIFF WBC: CPT | Performed by: FAMILY MEDICINE

## 2022-06-10 PROCEDURE — 3079F PR MOST RECENT DIASTOLIC BLOOD PRESSURE 80-89 MM HG: ICD-10-PCS | Mod: CPTII,S$GLB,, | Performed by: FAMILY MEDICINE

## 2022-06-10 PROCEDURE — 3075F SYST BP GE 130 - 139MM HG: CPT | Mod: CPTII,S$GLB,, | Performed by: FAMILY MEDICINE

## 2022-06-10 PROCEDURE — 3008F BODY MASS INDEX DOCD: CPT | Mod: CPTII,S$GLB,, | Performed by: FAMILY MEDICINE

## 2022-06-10 PROCEDURE — 1160F PR REVIEW ALL MEDS BY PRESCRIBER/CLIN PHARMACIST DOCUMENTED: ICD-10-PCS | Mod: CPTII,S$GLB,, | Performed by: FAMILY MEDICINE

## 2022-06-10 PROCEDURE — 99204 PR OFFICE/OUTPT VISIT, NEW, LEVL IV, 45-59 MIN: ICD-10-PCS | Mod: S$GLB,,, | Performed by: FAMILY MEDICINE

## 2022-06-10 PROCEDURE — 1160F RVW MEDS BY RX/DR IN RCRD: CPT | Mod: CPTII,S$GLB,, | Performed by: FAMILY MEDICINE

## 2022-06-10 PROCEDURE — 3079F DIAST BP 80-89 MM HG: CPT | Mod: CPTII,S$GLB,, | Performed by: FAMILY MEDICINE

## 2022-06-10 PROCEDURE — 80053 COMPREHEN METABOLIC PANEL: CPT | Performed by: FAMILY MEDICINE

## 2022-06-10 PROCEDURE — 84443 ASSAY THYROID STIM HORMONE: CPT | Performed by: FAMILY MEDICINE

## 2022-06-10 PROCEDURE — 36415 COLL VENOUS BLD VENIPUNCTURE: CPT | Mod: PO | Performed by: FAMILY MEDICINE

## 2022-06-10 PROCEDURE — 80061 LIPID PANEL: CPT | Performed by: FAMILY MEDICINE

## 2022-06-10 PROCEDURE — 3008F PR BODY MASS INDEX (BMI) DOCUMENTED: ICD-10-PCS | Mod: CPTII,S$GLB,, | Performed by: FAMILY MEDICINE

## 2022-06-10 PROCEDURE — 99999 PR PBB SHADOW E&M-EST. PATIENT-LVL IV: ICD-10-PCS | Mod: PBBFAC,,, | Performed by: FAMILY MEDICINE

## 2022-06-10 PROCEDURE — 99204 OFFICE O/P NEW MOD 45 MIN: CPT | Mod: S$GLB,,, | Performed by: FAMILY MEDICINE

## 2022-06-10 PROCEDURE — 99999 PR PBB SHADOW E&M-EST. PATIENT-LVL IV: CPT | Mod: PBBFAC,,, | Performed by: FAMILY MEDICINE

## 2022-06-10 PROCEDURE — 1159F MED LIST DOCD IN RCRD: CPT | Mod: CPTII,S$GLB,, | Performed by: FAMILY MEDICINE

## 2022-06-10 RX ORDER — PAROXETINE HYDROCHLORIDE 20 MG/1
20 TABLET, FILM COATED ORAL EVERY MORNING
COMMUNITY
Start: 2022-01-07 | End: 2023-03-11 | Stop reason: CLARIF

## 2022-06-10 RX ORDER — PROMETHAZINE HYDROCHLORIDE AND CODEINE PHOSPHATE 6.25; 1 MG/5ML; MG/5ML
5 SOLUTION ORAL EVERY 4 HOURS PRN
Qty: 180 ML | Refills: 0 | Status: SHIPPED | OUTPATIENT
Start: 2022-06-10 | End: 2022-06-21 | Stop reason: SDUPTHER

## 2022-06-10 RX ORDER — TRIAMTERENE AND HYDROCHLOROTHIAZIDE 37.5; 25 MG/1; MG/1
25 CAPSULE ORAL
COMMUNITY
Start: 2018-03-07 | End: 2022-06-10

## 2022-06-10 RX ORDER — METHYLPREDNISOLONE 4 MG/1
TABLET ORAL
Qty: 1 EACH | Refills: 0 | Status: SHIPPED | OUTPATIENT
Start: 2022-06-10 | End: 2023-03-11 | Stop reason: CLARIF

## 2022-06-10 RX ORDER — HYDROXYZINE PAMOATE 25 MG/1
25 CAPSULE ORAL DAILY
COMMUNITY
Start: 2014-03-10 | End: 2023-03-11 | Stop reason: CLARIF
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
## 2022-06-13 LAB — HCV AB SERPL QL IA: NEGATIVE

## 2022-06-21 ENCOUNTER — TELEPHONE (OUTPATIENT)
Dept: FAMILY MEDICINE | Facility: CLINIC | Age: 32
End: 2022-06-21

## 2022-06-21 RX ORDER — AZITHROMYCIN 250 MG/1
TABLET, FILM COATED ORAL
Qty: 6 TABLET | Refills: 0 | Status: SHIPPED | OUTPATIENT
Start: 2022-06-21 | End: 2023-03-11 | Stop reason: CLARIF

## 2022-06-21 RX ORDER — PROMETHAZINE HYDROCHLORIDE AND CODEINE PHOSPHATE 6.25; 1 MG/5ML; MG/5ML
5 SOLUTION ORAL EVERY 4 HOURS PRN
Qty: 180 ML | Refills: 0 | Status: SHIPPED | OUTPATIENT
Start: 2022-06-21 | End: 2022-07-01

## 2022-06-21 RX ORDER — METHYLPREDNISOLONE 4 MG/1
TABLET ORAL
Qty: 1 EACH | Refills: 0 | OUTPATIENT
Start: 2022-06-21

## 2022-06-21 NOTE — TELEPHONE ENCOUNTER
Pt states she is still having bad cough. Pt states cough syrup and tablets were helping, but not getting rid of cough. Pt requested refills, already forwarded requests to you. Pt requested message ab still having symptoms be send to

## 2022-06-21 NOTE — TELEPHONE ENCOUNTER
----- Message from Ella Wetsamantha sent at 6/21/2022  3:31 PM CDT -----  Contact: ELIS VAZQUEZ [[id number]]  ..Type:  Needs Medical Advice    Who Called: ELIS VAZQUEZ [[id number]]  Would the patient rather a call back or a response via [facility]? Call   Best Call Back Number: .[phone] (home)    Additional Information: Pt is req a call back in regards to the status of the medication per previous messages .. Thanks AW

## 2022-06-21 NOTE — TELEPHONE ENCOUNTER
----- Message from Mary Drake sent at 6/21/2022 11:51 AM CDT -----  Regarding: refill  Contact: patient  Patient is requesting refills on promethazine-codeine 6.25-10 mg/5 ml (PHENERGAN WITH CODEINE) 6.25-10 mg/5 mL syrup and methylPREDNISolone (MEDROL DOSEPACK) 4 mg tablet- Marvin, please call her back at [phone]

## 2023-03-11 ENCOUNTER — HOSPITAL ENCOUNTER (EMERGENCY)
Facility: HOSPITAL | Age: 33
Discharge: HOME | End: 2023-03-11
Attending: EMERGENCY MEDICINE
Payer: COMMERCIAL

## 2023-03-11 ENCOUNTER — NURSE TRIAGE (OUTPATIENT)
Dept: ADMINISTRATIVE | Facility: CLINIC | Age: 33
End: 2023-03-11
Payer: COMMERCIAL

## 2023-03-11 VITALS
BODY MASS INDEX: 48.82 KG/M2 | OXYGEN SATURATION: 98 % | SYSTOLIC BLOOD PRESSURE: 156 MMHG | DIASTOLIC BLOOD PRESSURE: 93 MMHG | TEMPERATURE: 98 F | WEIGHT: 275.56 LBS | HEART RATE: 82 BPM | RESPIRATION RATE: 18 BRPM

## 2023-03-11 DIAGNOSIS — Z76.89 ENCOUNTER FOR INCISION AND DRAINAGE PROCEDURE: Primary | ICD-10-CM

## 2023-03-11 DIAGNOSIS — L02.91 ABSCESS: ICD-10-CM

## 2023-03-11 PROCEDURE — 25000003 PHARM REV CODE 250: Mod: ER | Performed by: EMERGENCY MEDICINE

## 2023-03-11 PROCEDURE — 56405 I&D VULVA/PERINEAL ABSCESS: CPT | Mod: ER

## 2023-03-11 PROCEDURE — 99284 EMERGENCY DEPT VISIT MOD MDM: CPT | Mod: 25,ER

## 2023-03-11 RX ORDER — HYDROCODONE BITARTRATE AND ACETAMINOPHEN 5; 325 MG/1; MG/1
1 TABLET ORAL EVERY 6 HOURS PRN
Qty: 12 TABLET | Refills: 0 | Status: SHIPPED | OUTPATIENT
Start: 2023-03-11

## 2023-03-11 RX ORDER — TRIAMTERENE AND HYDROCHLOROTHIAZIDE 37.5; 25 MG/1; MG/1
1 CAPSULE ORAL EVERY MORNING
COMMUNITY

## 2023-03-11 RX ORDER — LIDOCAINE HYDROCHLORIDE 20 MG/ML
5 INJECTION, SOLUTION EPIDURAL; INFILTRATION; INTRACAUDAL; PERINEURAL
Status: COMPLETED | OUTPATIENT
Start: 2023-03-11 | End: 2023-03-11

## 2023-03-11 RX ORDER — CLINDAMYCIN HYDROCHLORIDE 300 MG/1
300 CAPSULE ORAL EVERY 8 HOURS
Qty: 21 CAPSULE | Refills: 0 | Status: SHIPPED | OUTPATIENT
Start: 2023-03-11 | End: 2023-03-18

## 2023-03-11 RX ADMIN — LIDOCAINE HYDROCHLORIDE 100 MG: 20 INJECTION, SOLUTION EPIDURAL; INFILTRATION; INTRACAUDAL at 04:03

## 2023-03-11 NOTE — ED PROVIDER NOTES
Emergency Medicine Provider Note - 3/11/2023        History     Chief Complaint   Patient presents with    Abscess     Abscess near vagina since Wednesday. Denies drainage.           History of Present Illness   HPI    3/11/2023, 3:48 PM  The history is provided by the patient    Gerson Jimenez is a 32 y.o. female presenting to the ED for abscess near vagina.   Onset 4 days prior.  Patient has expressed some discharge from it.  No fever, nausea, vomiting, chills, polyuria, polydipsia.  Last menstrual period was February 14th.      Arrival mode:  Personal Vehicle      PCP: Primary Doctor No     Allergies:  Review of patient's allergies indicates:   Allergen Reactions    Pcn [penicillins] Nausea And Vomiting       Past Medical History:  Past Medical History:   Diagnosis Date    Essential (primary) hypertension     CAROLYN (generalized anxiety disorder)     Morbid obesity     PTSD (post-traumatic stress disorder)     Spinal stenosis of lumbar region without neurogenic claudication        Past Surgical History:  Past Surgical History:   Procedure Laterality Date    APPENDECTOMY           Family History:  Family History   Problem Relation Age of Onset    Diabetes Mother     Hypertension Mother     Breast cancer Mother     Diabetes Father     Hypertension Father     Heart failure Maternal Grandmother     Breast cancer Maternal Grandmother        Social History:  Social History     Tobacco Use    Smoking status: Never    Smokeless tobacco: Never   Substance and Sexual Activity    Alcohol use: Yes     Comment: Monthly or less    Drug use: No    Sexual activity: Yes       Triage note, Allergies, Past Medical History, Past Surgical History, Family History and Social History reviewed as documented above.     Review of Systems   Review of Systems   Constitutional:  Negative for chills and fever.   Gastrointestinal:  Negative for abdominal pain, nausea and vomiting.   Genitourinary:  Negative for dysuria and frequency.   Skin:   Positive for wound.        Physical Exam     Initial Vitals   BP Pulse Resp Temp SpO2   03/11/23 1700 03/11/23 1525 03/11/23 1525 03/11/23 1525 03/11/23 1525   (!) 156/93 90 16 98.1 °F (36.7 °C) 98 %      MAP       --                 Physical Exam  Genitourinary:          Nursing Notes and Vital Signs Reviewed.  Constitutional: Patient is in . Well-developed and well-nourished.  Head: Atraumatic. Normocephalic.  Eyes: PERRL. EOM intact. Conjunctivae are not pale. No scleral icterus.  Cardiovascular: Regular rate. Regular rhythm. No murmurs, rubs, or gallops.   Pulmonary/Chest: No respiratory distress. Clear to auscultation bilaterally. No wheezing or rales.  Abdominal: Soft and non-distended.  There is no tenderness.  No rebound, guarding, or rigidity. Good bowel sounds.  Genitourinary: No CVA tenderness  Musculoskeletal: Moves all extremities. No obvious deformities. No edema. No calf tenderness.  Skin: Warm and dry.  Neurological:  Alert, awake, and appropriate.  Normal speech.  No acute focal neurological deficits are appreciated.  Psychiatric: Normal affect. Good eye contact. Appropriate in content.     ED Course     ED Procedures:  I & D - Incision and Drainage    Date/Time: 3/11/2023 4:55 PM  Location procedure was performed: [facility] EMERGENCY DEPARTMENT  Performed by: Corby Araiza NP  Authorized by: Kelsea Mccann DO   Type: abscess  Body area: anogenital  Location details: vulva  Anesthesia: local infiltration    Anesthesia:  Local Anesthetic: lidocaine 1% without epinephrine  Anesthetic total: 3 mL    Patient sedated: no  Scalpel size: 11  Incision type: single straight  Complexity: simple  Drainage: purulent and bloody  Drainage amount: moderate  Wound treatment: incision, drainage, expression of material and wound packed  Packing material: 1/4 in gauze  Complications: No  Patient tolerance: Patient tolerated the procedure well with no immediate complications        ED Vital Signs:  Vitals:    03/11/23  1525 03/11/23 1700   BP:  (!) 156/93   Pulse: 90 82   Resp: 16 18   Temp: 98.1 °F (36.7 °C)    TempSrc: Oral    SpO2: 98% 98%   Weight: 125 kg (275 lb 9.2 oz)        Abnormal Lab Results:  Labs Reviewed - No data to display     All Lab Results:  none    Imaging Results:  Imaging Results    None               The Emergency Provider reviewed the vital signs and test results, which are outlined above.     ED Discussion             17:04  Reassessment: Dr. Mccann reassessed the pt.  The pt is resting comfortably and is NAD.  Pt states their sx have improved. Discussed test results, shared treatment plan, specific conditions for return, and the need for f/u.  Answered their questions at this time.  Pt understands and agrees to the plan.  The pt has remained hemodynamically stable through ED course and is stable for discharge.      I discussed with patient and/or family/caretaker that evaluation in the ED does not suggest any emergent or life threatening medical conditions requiring immediate intervention beyond what was provided in the ED, and I believe patient is safe for discharge.  Regardless, an unremarkable evaluation in the ED does not preclude the development or presence of a serious of life threatening condition. As such, patient was instructed to return immediately for any worsening or change in current symptoms.      ED Medication(s):  Medications   LIDOcaine (PF) 20 mg/mL (2%) injection 100 mg (100 mg Intradermal Given by Provider 3/11/23 4405)         Medical Decision Making   Medical Decision Making  Presents with 4 day history abscess to perineum.  Spontaneously draining.  Shared decision-making discussed with patient.  Discussed options undergoing incision and drainage to further open the abscess versus not.  Patient elected to undergo incision and drainage.  The incision and drainage procedure was performed by nurse practitioner Corby Araiza.     Problems Addressed:  Abscess: acute illness or  "injury    Risk  Prescription drug management.        Comorbid Medical Conditions Considered: None     Differential Diagnoses: Based on the available information and the initial assessment, the working DIFFERENTIAL DIAGNOSIS considered during this evaluation included, but not limited to:    Bartholin cyst, abscess, cellulitis, ingrown hair, chancre, herpes, perirectal abscess                            Notable Abnormal Lab:  Notable abnormal findings from INDEPENDENT REVIEW of ORDERED LABS include: None and N/A    Social Determinates: Factored in the Treatment and Disposition of patient included:  None    Discussed case with:N/A          MIPS Measures     Smoker? No     Hypertension: History of Hypertension: The patient has elevated blood pressure (higher than 120/80) while being treated in the ED but has a history of hypertension.    Portions of this note may have been created with voice recognition software. Occasional "wrong-word" or "sound-a-like" substitutions may have occurred due to the inherent limitations of voice recognition software. Please, read the note carefully and recognize, using context, where substitutions have occurred.            Clinical Impression       ICD-10-CM ICD-9-CM   1. Encounter for incision and drainage procedure  Z76.89 V72.85   2. Abscess, perineum  L02.91 682.9         ED Disposition       Disposition: Discharge to home  Patient condition: Good    Medication List     Medication List        START taking these medications      clindamycin 300 MG capsule  Commonly known as: CLEOCIN  Take 1 capsule (300 mg total) by mouth every 8 (eight) hours. for 7 days     HYDROcodone-acetaminophen 5-325 mg per tablet  Commonly known as: NORCO  Take 1 tablet by mouth every 6 (six) hours as needed for Pain.            ASK your doctor about these medications      triamterene-hydrochlorothiazide 37.5-25 mg 37.5-25 mg per capsule  Commonly known as: DYAZIDE               Where to Get Your Medications    "     You can get these medications from any pharmacy    Bring a paper prescription for each of these medications  clindamycin 300 MG capsule  HYDROcodone-acetaminophen 5-325 mg per tablet         ED Follow-up   Follow-up Information       [facility] - Emergency Dept In 2 days.    Specialty: Emergency Medicine  Why: For packing removal.  Return to emergency department for fever, worsening pain, shaking chills, increasing swelling, or other concerns.  Contact information:  [address] [address] [address] [phone]                                    Kelsea Mccann,   03/12/23 0623

## 2023-03-11 NOTE — TELEPHONE ENCOUNTER
[place]    PCP:  Dr. Sarina Restrepo    C/O ~ size of golf ball cyst on labia, painful, and redness.  Per protocol, care advised is see HCP within 4 hrs.  Offered OCA but declined therefore advised to [facility]/ED.  Pt states going to the ED now.  Advised to call for worsening/questions/concerns.  VU.  Unable to route d/t non-[facility] provider.    Reason for Disposition   [1] SEVERE pain AND [2] not improved 2 hours after pain medicine    Additional Information   Negative: Sounds like a life-threatening emergency to the triager   Swelling of labia   Negative: Patient sounds very sick or weak to the triager    Protocols used: Skin Lump or Localized Swelling-A-AH, Vulvar Symptoms-A-AH

## 2023-03-13 ENCOUNTER — HOSPITAL ENCOUNTER (EMERGENCY)
Facility: HOSPITAL | Age: 33
Discharge: HOME | End: 2023-03-13
Attending: EMERGENCY MEDICINE
Payer: COMMERCIAL

## 2023-03-13 VITALS
HEIGHT: 64 IN | TEMPERATURE: 98 F | BODY MASS INDEX: 47.56 KG/M2 | RESPIRATION RATE: 18 BRPM | WEIGHT: 278.56 LBS | OXYGEN SATURATION: 100 % | HEART RATE: 77 BPM | DIASTOLIC BLOOD PRESSURE: 88 MMHG | SYSTOLIC BLOOD PRESSURE: 167 MMHG

## 2023-03-13 DIAGNOSIS — Z51.89 VISIT FOR WOUND CHECK: Primary | ICD-10-CM

## 2023-03-13 DIAGNOSIS — Z48.00 ABSCESS PACKING REMOVAL: ICD-10-CM

## 2023-03-13 PROCEDURE — 99281 EMR DPT VST MAYX REQ PHY/QHP: CPT | Mod: ER

## 2023-03-13 NOTE — ED PROVIDER NOTES
Encounter Date: 3/13/2023       History     Chief Complaint   Patient presents with    Wound Check     Packing placed in R labia on Saturday, no signs of infection, pt here for packing removal      Patient presents to ER for wound check.  Patient was evaluated here in ER 2 days ago for labial abscess.  She underwent I&D of abscess with packing insertion.  She states she accidentally removed packing at home.  She is here for re-evaluation.  She denies any sign of worsening symptoms.  She was discharged home 2 days ago with prescription for clindamycin and Norco which she is taking as prescribed.  She denies fever, chills, generalized body aches, drainage, erythema.    The history is provided by the patient.   Review of patient's allergies indicates:   Allergen Reactions    Pcn [penicillins] Nausea And Vomiting     Past Medical History:   Diagnosis Date    Essential (primary) hypertension     CAROLYN (generalized anxiety disorder)     Morbid obesity     PTSD (post-traumatic stress disorder)     Spinal stenosis of lumbar region without neurogenic claudication      Past Surgical History:   Procedure Laterality Date    APPENDECTOMY       Family History   Problem Relation Age of Onset    Diabetes Mother     Hypertension Mother     Breast cancer Mother     Diabetes Father     Hypertension Father     Heart failure Maternal Grandmother     Breast cancer Maternal Grandmother      Social History     Tobacco Use    Smoking status: Never    Smokeless tobacco: Never   Substance Use Topics    Alcohol use: Yes     Comment: Monthly or less    Drug use: No     Review of Systems   Constitutional:  Negative for chills, fatigue and fever.   HENT:  Negative for ear pain.    Eyes:  Negative for pain.   Respiratory:  Negative for cough and shortness of breath.    Cardiovascular:  Negative for chest pain.   Gastrointestinal:  Negative for abdominal pain, nausea and vomiting.   Genitourinary:  Negative for dysuria.   Musculoskeletal:  Negative  for back pain, myalgias and neck pain.   Skin:  Negative for rash.        +previous abscess.   Neurological:  Negative for weakness and headaches.   All other systems reviewed and are negative.    Physical Exam     Initial Vitals [03/13/23 1403]   BP Pulse Resp Temp SpO2   (!) 167/88 77 18 98.4 °F (36.9 °C) 100 %      MAP       --         Physical Exam    Nursing note and vitals reviewed.  Constitutional: She appears well-developed and well-nourished. She is not diaphoretic. No distress.   HENT:   Head: Normocephalic and atraumatic.   Eyes: EOM are normal.   Neck: Neck supple.   Normal range of motion.  Cardiovascular:  Normal rate, regular rhythm and intact distal pulses.           Pulmonary/Chest: Breath sounds normal. No respiratory distress.   Genitourinary:    Genitourinary Comments: Female chaperone, RT, present during  examination.  Site of previous incision and drainage is clean and dry, no surrounding erythema.  Minimal tenderness upon palpation.  No fluctuance is noted.  No active drainage is noted.  No packing visualized on exam (patient states self -removed at home).  No sign of worsening infection.     Musculoskeletal:         General: Normal range of motion.      Cervical back: Normal range of motion and neck supple.     Neurological: She is alert and oriented to person, place, and time. She has normal strength. GCS score is 15. GCS eye subscore is 4. GCS verbal subscore is 5. GCS motor subscore is 6.   Skin: Skin is warm and dry. Capillary refill takes less than 2 seconds.       ED Course   Procedures  Labs Reviewed - No data to display       Imaging Results    None          Medications - No data to display               Patient self-removed packing at home. There is no sign worsening infection.  Patient states she is taking antibiotics as previously prescribed.  Patient with no further concerns.  She is non ill/nontoxic-appearing.  Vital signs are stable.  Patient voices comfortable discharge home.   She is requesting work excuse, provided.  Discussed signs and symptoms to return to ER.  Discussed outpatient follow-up with PCP.              Clinical Impression:   Final diagnoses:  [Z51.89] Visit for wound check (Primary)  [Z48.00] Abscess packing removal        ED Disposition Condition    Discharge Stable          ED Prescriptions    None       Follow-up Information       Follow up With Specialties Details Why Contact Info    Follow follow-up with your PCP in 2 days for re-evaluation.        [facility] - Emergency Dept Emergency Medicine  As needed, If symptoms worsen [address] [facility] [address]  [facility] [id number] [phone]             Corby Araiza, FRED  03/14/23 1234

## 2023-03-13 NOTE — Clinical Note
"Gerson MCINTYREIE" Tony was seen and treated in our emergency department on 3/13/2023.  She may return to work on 03/15/2023.       If you have any questions or concerns, please don't hesitate to call.      Corby Araiza NP"

## 2023-03-15 ENCOUNTER — HOSPITAL ENCOUNTER (EMERGENCY)
Facility: HOSPITAL | Age: 33
Discharge: HOME | End: 2023-03-15
Payer: COMMERCIAL

## 2023-03-15 VITALS
WEIGHT: 274.81 LBS | DIASTOLIC BLOOD PRESSURE: 96 MMHG | RESPIRATION RATE: 18 BRPM | BODY MASS INDEX: 47.17 KG/M2 | HEART RATE: 90 BPM | OXYGEN SATURATION: 100 % | SYSTOLIC BLOOD PRESSURE: 183 MMHG | TEMPERATURE: 97 F

## 2023-03-15 LAB
ALBUMIN SERPL BCP-MCNC: 3.6 G/DL (ref 3.5–5.2)
ALP SERPL-CCNC: 69 U/L (ref 55–135)
ALT SERPL W/O P-5'-P-CCNC: 21 U/L (ref 10–44)
ANION GAP SERPL CALC-SCNC: 10 MMOL/L (ref 8–16)
AST SERPL-CCNC: 16 U/L (ref 10–40)
BASOPHILS # BLD AUTO: 0.05 K/UL (ref 0–0.2)
BASOPHILS NFR BLD: 0.6 % (ref 0–1.9)
BILIRUB SERPL-MCNC: 0.6 MG/DL (ref 0.1–1)
BUN SERPL-MCNC: 12 MG/DL (ref 6–20)
CALCIUM SERPL-MCNC: 8.9 MG/DL (ref 8.7–10.5)
CHLORIDE SERPL-SCNC: 103 MMOL/L (ref 95–110)
CO2 SERPL-SCNC: 23 MMOL/L (ref 23–29)
CREAT SERPL-MCNC: 0.8 MG/DL (ref 0.5–1.4)
DIFFERENTIAL METHOD: NORMAL
EOSINOPHIL # BLD AUTO: 0.1 K/UL (ref 0–0.5)
EOSINOPHIL NFR BLD: 0.9 % (ref 0–8)
ERYTHROCYTE [DISTWIDTH] IN BLOOD BY AUTOMATED COUNT: 12.8 % (ref 11.5–14.5)
EST. GFR  (NO RACE VARIABLE): >60 ML/MIN/1.73 M^2
GLUCOSE SERPL-MCNC: 139 MG/DL (ref 70–110)
HCT VFR BLD AUTO: 40 % (ref 37–48.5)
HGB BLD-MCNC: 13.4 G/DL (ref 12–16)
IMM GRANULOCYTES # BLD AUTO: 0.03 K/UL (ref 0–0.04)
IMM GRANULOCYTES NFR BLD AUTO: 0.4 % (ref 0–0.5)
LACTATE SERPL-SCNC: 1.8 MMOL/L (ref 0.5–2.2)
LYMPHOCYTES # BLD AUTO: 2 K/UL (ref 1–4.8)
LYMPHOCYTES NFR BLD: 24.2 % (ref 18–48)
MCH RBC QN AUTO: 27.8 PG (ref 27–31)
MCHC RBC AUTO-ENTMCNC: 33.5 G/DL (ref 32–36)
MCV RBC AUTO: 83 FL (ref 82–98)
MONOCYTES # BLD AUTO: 0.3 K/UL (ref 0.3–1)
MONOCYTES NFR BLD: 4 % (ref 4–15)
NEUTROPHILS # BLD AUTO: 5.9 K/UL (ref 1.8–7.7)
NEUTROPHILS NFR BLD: 69.9 % (ref 38–73)
NRBC BLD-RTO: 0 /100 WBC
PLATELET # BLD AUTO: 274 K/UL (ref 150–450)
PMV BLD AUTO: 10.9 FL (ref 9.2–12.9)
POTASSIUM SERPL-SCNC: 3.5 MMOL/L (ref 3.5–5.1)
PROT SERPL-MCNC: 7.9 G/DL (ref 6–8.4)
RBC # BLD AUTO: 4.82 M/UL (ref 4–5.4)
SODIUM SERPL-SCNC: 136 MMOL/L (ref 136–145)
WBC # BLD AUTO: 8.43 K/UL (ref 3.9–12.7)

## 2023-03-15 PROCEDURE — 87040 BLOOD CULTURE FOR BACTERIA: CPT | Performed by: NURSE PRACTITIONER

## 2023-03-15 PROCEDURE — 85025 COMPLETE CBC W/AUTO DIFF WBC: CPT | Performed by: NURSE PRACTITIONER

## 2023-03-15 PROCEDURE — 99283 EMERGENCY DEPT VISIT LOW MDM: CPT

## 2023-03-15 PROCEDURE — 83605 ASSAY OF LACTIC ACID: CPT | Performed by: NURSE PRACTITIONER

## 2023-03-15 PROCEDURE — 80053 COMPREHEN METABOLIC PANEL: CPT | Performed by: NURSE PRACTITIONER

## 2023-03-15 NOTE — FIRST PROVIDER EVALUATION
Medical screening examination initiated.  I have conducted a focused provider triage encounter, findings are as follows:    Brief history of present illness:  reports seen here several day ago for labial abscess. Had I and d. Reports taking clinda. Reports no relief and doctor sent her of iv antibiotics     Vitals:    03/15/23 1302   BP: (!) 183/96   BP Location: Right arm   Patient Position: Sitting   Pulse: 90   Resp: 18   Temp: 97.4 °F (36.3 °C)   TempSrc: Oral   SpO2: 100%   Weight: 124.6 kg (274 lb 12.9 oz)       Pertinent physical exam:  nad    Brief workup plan:  labs, bed for exam    Preliminary workup initiated; this workup will be continued and followed by the physician or advanced practice provider that is assigned to the patient when roomed.

## 2023-03-21 LAB — BACTERIA BLD CULT: NORMAL
